# Patient Record
Sex: FEMALE | Race: WHITE | URBAN - METROPOLITAN AREA
[De-identification: names, ages, dates, MRNs, and addresses within clinical notes are randomized per-mention and may not be internally consistent; named-entity substitution may affect disease eponyms.]

---

## 2019-10-03 LAB
AVERAGE GLUCOSE: NORMAL
HBA1C MFR BLD: 5.6 %

## 2022-06-21 ENCOUNTER — APPOINTMENT (RX ONLY)
Dept: URBAN - METROPOLITAN AREA CLINIC 339 | Facility: CLINIC | Age: 62
Setting detail: DERMATOLOGY
End: 2022-06-21

## 2022-06-21 DIAGNOSIS — L91.8 OTHER HYPERTROPHIC DISORDERS OF THE SKIN: ICD-10-CM

## 2022-06-21 DIAGNOSIS — D18.0 HEMANGIOMA: ICD-10-CM

## 2022-06-21 DIAGNOSIS — D22 MELANOCYTIC NEVI: ICD-10-CM

## 2022-06-21 DIAGNOSIS — L81.5 LEUKODERMA, NOT ELSEWHERE CLASSIFIED: ICD-10-CM

## 2022-06-21 DIAGNOSIS — L81.4 OTHER MELANIN HYPERPIGMENTATION: ICD-10-CM

## 2022-06-21 DIAGNOSIS — L82.1 OTHER SEBORRHEIC KERATOSIS: ICD-10-CM

## 2022-06-21 PROBLEM — D22.5 MELANOCYTIC NEVI OF TRUNK: Status: ACTIVE | Noted: 2022-06-21

## 2022-06-21 PROBLEM — D18.01 HEMANGIOMA OF SKIN AND SUBCUTANEOUS TISSUE: Status: ACTIVE | Noted: 2022-06-21

## 2022-06-21 PROCEDURE — 99213 OFFICE O/P EST LOW 20 MIN: CPT

## 2022-06-21 PROCEDURE — ? COUNSELING

## 2022-06-21 PROCEDURE — ? FULL BODY SKIN EXAM

## 2022-06-21 PROCEDURE — ? TREATMENT REGIMEN

## 2022-06-21 ASSESSMENT — LOCATION DETAILED DESCRIPTION DERM
LOCATION DETAILED: LEFT RIB CAGE
LOCATION DETAILED: LEFT PROXIMAL PRETIBIAL REGION
LOCATION DETAILED: EPIGASTRIC SKIN
LOCATION DETAILED: RIGHT PROXIMAL DORSAL FOREARM
LOCATION DETAILED: SUBXIPHOID
LOCATION DETAILED: LEFT PROXIMAL DORSAL FOREARM

## 2022-06-21 ASSESSMENT — LOCATION SIMPLE DESCRIPTION DERM
LOCATION SIMPLE: LEFT PRETIBIAL REGION
LOCATION SIMPLE: ABDOMEN
LOCATION SIMPLE: RIGHT FOREARM
LOCATION SIMPLE: LEFT FOREARM

## 2022-06-21 ASSESSMENT — LOCATION ZONE DERM
LOCATION ZONE: ARM
LOCATION ZONE: TRUNK
LOCATION ZONE: LEG

## 2023-01-09 ENCOUNTER — OFFICE VISIT (OUTPATIENT)
Dept: ORTHOPEDIC SURGERY | Age: 63
End: 2023-01-09
Payer: OTHER GOVERNMENT

## 2023-01-09 VITALS — HEIGHT: 64 IN | BODY MASS INDEX: 24.75 KG/M2 | WEIGHT: 145 LBS

## 2023-01-09 DIAGNOSIS — M48.56XA PATHOLOGICAL COMPRESSION FRACTURE OF LUMBAR VERTEBRA, INITIAL ENCOUNTER (HCC): Primary | ICD-10-CM

## 2023-01-09 DIAGNOSIS — M51.36 DDD (DEGENERATIVE DISC DISEASE), LUMBAR: ICD-10-CM

## 2023-01-09 DIAGNOSIS — M43.16 SPONDYLOLISTHESIS OF LUMBAR REGION: ICD-10-CM

## 2023-01-09 DIAGNOSIS — M47.816 FACET ARTHROPATHY, LUMBAR: ICD-10-CM

## 2023-01-09 PROCEDURE — 99204 OFFICE O/P NEW MOD 45 MIN: CPT | Performed by: PHYSICIAN ASSISTANT

## 2023-01-09 RX ORDER — TRAMADOL HYDROCHLORIDE 50 MG/1
50 TABLET ORAL EVERY 8 HOURS PRN
Qty: 21 TABLET | Refills: 0 | Status: SHIPPED | OUTPATIENT
Start: 2023-01-09 | End: 2023-01-16

## 2023-01-09 RX ORDER — SIMVASTATIN 40 MG
TABLET ORAL
COMMUNITY
Start: 2022-11-15

## 2023-01-09 RX ORDER — IBUPROFEN 600 MG/1
TABLET ORAL
COMMUNITY
Start: 2022-12-12

## 2023-01-09 NOTE — PATIENT INSTRUCTIONS
VERTEBRAL KYPHOPLASTY    Kyphoplasty is a vertebral augmentation surgery to treat fractures in the vertebra. These fractures may occur because of conditions such as osteoporosis or trauma. Vertebroplasty is a similar technique. Both procedures involve injecting acrylic bone cement into the fracture through a hole in the skin. The difference between the two techniques is that kyphoplasty also attempts to address curvature of the spine to restore height. This involves inflating a small balloon in the vertebra to create space before injecting the bone cement. Some doctors refer to this procedure as balloon kyphoplasty. Why is it needed? A surgeon may perform kyphoplasty for a vertebral compression fracture (VCF). VCFs occur when the bony block in the spine collapses, which may cause extreme pain, deformity, and loss of height. The incidence of VCFs increases with age and most result from low energy injuries in older people with osteoporosis. However, other possible causes of VCFs include trauma from car accidents or sports injuries or tumors that have started in the spine or spread to the bones. According to [address] from the [place] [employer] ([employer]), many people who have a VCF recover within 3 months without specific treatment to repair the fracture. Sometimes, a doctor will recommend that a person wears a brace to restrict movement, which helps the fracture to heal.   If a person has severe pain that does not respond to non-surgical treatment, then surgery is an option. Procedure   During the days before surgery, a doctor may recommend that a person avoids taking drugs that make it hard for blood to clot, such as aspirin and warfarin. On the day of surgery, a person will probably be told not to eat or drink anything for several hours before the procedure. A person can take any medication a doctor approves, along with sips of water.   A surgeon will perform kyphoplasty in a hospital or outpatient clinic. A person may have either local or general anesthetic. The surgical procedure is as follows:   A surgeon will insert a needle into the spine bone through the skin. They will then use X-ray images to guide them to the correct area. They will then place a small device called a balloon tamp through the needle and into the fractured vertebra. The surgeon will inflate the balloon tamp. This helps to restore the height of the vertebrae. When the surgeon removes the balloon tamp, it leaves a cavity that is injected with acrylic bone cement to prevent it from collapsing again. Most people will be able to go home from the hospital the same day. A person should not drive unless a medical professional approves it. Recovery time  The [employer] suggest that following surgery, most people can go back to their normal activities of daily living with no restrictions. A person should check with their doctor or surgeon if there are any activities that they should avoid during the recovery period. A person may feel some soreness in their back where the surgeon inserted the needle, but this will not usually last longer than a few days. Risks and complications  The risks and complications of kyphoplasty may include:  infection   increased back pain   bleeding   nerve damage   allergic reaction to the chemicals used with X-rays to help guide the surgeon   acrylic bone cement leaking out of position    A 2016 review indicates that although risks are rare, serious complications can occur, including spinal cord compression, nerve root compression, venous embolism, and pulmonary embolism, including cardiovascular collapse. According to the [employer], Kyphoplasty complication rates are at less than 2% for osteoporotic VCFs, and up to 10% for malignant tumor-related VCFs. Benefits  Osteoporotic vertebral fractures have associations with increased morbidity. Techniques such as kyphoplasty can provide pain relief and improve spinal support function. People who have a kyphoplasty procedure may have a better quality of life afterward. They may require fewer pain relievers and be more mobile. However, the effectiveness of kyphoplasty seems to be under debate in the medical community. A person should discuss the benefits and risks with their doctor. ummary   Kyphoplasty may improve a persons quality of life and mobility and reduce pain and the use of pain relief medication. A doctor may recommend kyphoplasty if non-surgical means do not work. The effectiveness of kyphoplasty is under debate, and there is a risk of complications. Someone who is considering having this procedure should discuss the benefits and risks with their doctor. Patient Education        Compression Fracture of the Spine: Care Instructions  Overview     A compression fracture happens when the front part of a spinal bone breaks and collapses. A fall or other accident can cause it. A minor injury or moving the wrong way can cause a break if you have thin or brittle bones (osteoporosis). These types of breaks usually will heal within a few months. You may need to rest at first, but it's best to return to your normal activity as soon as you can. You may need medicine for pain. Your doctor may recommend physical therapy. Some doctors recommend that certain people with compression fractures wear braces. Your doctor also may treat thin or brittle bones. You may need surgery if you have lasting pain or if the bone presses on the spinal cord or nerves. You heal best when you take good care of yourself. Eat a variety of healthy foods, and don't smoke. Follow-up care is a key part of your treatment and safety. Be sure to make and go to all appointments, and call your doctor if you are having problems. It's also a good idea to know your test results and keep a list of the medicines you take.   How can you care for yourself at home? Be safe with medicines. Read and follow all instructions on the label. If the doctor gave you a prescription medicine for pain, take it as prescribed. If you are not taking a prescription pain medicine, ask your doctor if you can take an over-the-counter medicine. Talk to your doctor about how to make your bones stronger. You may need medicines or a change in what you eat. Be active only as directed by your doctor. When should you call for help? Call 911 anytime you think you may need emergency care. For example, call if:    You are unable to move an arm or a leg at all. Call your doctor now or seek immediate medical care if:    You have new or worse symptoms in your arms, legs, belly, or buttocks. Symptoms may include:  Numbness or tingling. Weakness. Pain. You lose bladder or bowel control. You have belly pain, bloating, vomiting, or nausea. Watch closely for changes in your health, and be sure to contact your doctor if:    You do not get better as expected. Where can you learn more? Go to http://www.woods.com/ and enter P445 to learn more about \"Compression Fracture of the Spine: Care Instructions. \"  Current as of: March 9, 2022               Content Version: 13.5  © 6115-4992 [employer]. Care instructions adapted under license by [facility] ([facility]). If you have questions about a medical condition or this instruction, always ask your healthcare professional. [place] [address] any warranty or liability for your use of this information.

## 2023-01-09 NOTE — PROGRESS NOTES
Name: German Stanton  YOB: 1960  Gender: female  MRN: [id number]    Back Pain (L5 Comp fracture examination)       History of Present Illness: This is a very pleasant 60y.o. year old female who number seventh fell from a 2 step ladder landing on her buttocks. Severe pain in the lower back at that time. The following day she went to urgent care. L5 compression deformity was noted. Patient reports symptoms mostly in the lower back can radiate somewhat into the right buttock there is no leg pain. She does have some numbness and tingling of bilateral feet but this is felt to be from neuropathy from chemotherapy from her breast cancer. Patient reports that symptoms have improved from the initial injury 1 month ago but not resolved. Pain is worse with getting up and down from sitting positions, increased activity. Pain is still 6 out of 10. She is taking Tylenol, ibuprofen and aspirin. This patient has not had lumbar surgery in the past.          AMB PAIN ASSESSMENT 1/9/2023   Location of Pain Back   Severity of Pain 6   Quality of Pain Aching   Duration of Pain Persistent   Frequency of Pain Constant   Date Pain First Started 12/7/2022   Aggravating Factors Other (Comment)   Limiting Behavior No   Relieving Factors Other (Comment); Nsaids   Result of Injury No   Work-Related Injury No   Are there other pain locations you wish to document? No            ROS/Meds/PSH/PMH/FH/SH: I personally reviewed the patient's collected intake data. Below are the pertinents:    Allergies   Allergen Reactions    Amoxicillin-Pot Clavulanate      Other reaction(s): Rash       Current Outpatient Medications:     ibuprofen (ADVIL;MOTRIN) 600 MG tablet, , Disp: , Rfl:     metoprolol tartrate (LOPRESSOR) 25 MG tablet, , Disp: , Rfl:     simvastatin (ZOCOR) 40 MG tablet, , Disp: , Rfl:     traMADol (ULTRAM) 50 MG tablet, Take 1 tablet by mouth every 8 hours as needed for Pain for up to 7 days.  Intended supply: 7 days. Take lowest dose possible to manage pain Max Daily Amount: 150 mg, Disp: 21 tablet, Rfl: 0  There is no problem list on file for this patient. Tobacco:  reports that she has never smoked. She has never used smokeless tobacco.  Alcohol:   Social History     Substance and Sexual Activity   Alcohol Use None        Physical Exam:   BMI: Body mass index is 24.89 kg/m². GENERAL:  Adult in no acute distress, well developed, well nourished . Patient is appropriately conversant  MSK:  Examination of the lumbar spine reveals no sagittal or coronal imbalance. There is moderate tenderness to palpation along the spinous processes and paraspinal musculature. The patient ambulates with a normal gait. ROM of bilateral hip(s) reveals no irritability. NEURO:  Cranial nerves grossly intact. No motor deficits. Straight leg testing is negative  Sensory testing reveals intact sensation to light touch and in the distribution of the L3-S1 dermatomes bilaterally      Ankle jerk is negative for clonus  Babinski is negative    Reflexes   Right Left   Quadriceps (L4) 2 2   Achilles (S1) 2 2     Strength testing in the lower extremity reveals the following based on the 5 point grading scale:     HF (L2) H Ab (L5) KE (L3/4) ADF (L4) EHL (L5) A Ev (S1) APF (S1)   Right 5 5 5 5 5 5 5   Left 5 5 5 5 5 5 5     PSYCH:  Alert and oriented X 3. Appropriate affect. Intact judgment and insight. Radiographic Studies:    AP and Lateral lumbar spine: 12/8/22  Independently reviewed the x-rays from 12/8/2022. There is facet arthropathy L4-5 L5-S1. There is a subtle anterior listhesis L4-5. There is compression of the superior endplate of L5 vertebra partially 30%. This is compression fracture likely acute. Assessment/Plan:       Diagnosis Orders   1.  Pathological compression fracture of lumbar vertebra, initial encounter ([facility])  traMADol (ULTRAM) 50 MG tablet    MRI LUMBAR SPINE WO CONTRAST    Amb External Referral For Orthotics      2. Facet arthropathy, lumbar  MRI LUMBAR SPINE WO CONTRAST    Amb External Referral For Orthotics      3. DDD (degenerative disc disease), lumbar  MRI LUMBAR SPINE WO CONTRAST    Amb External Referral For Orthotics      4. Spondylolisthesis of lumbar region  MRI LUMBAR SPINE WO CONTRAST    Amb External Referral For Orthotics            This patient's clinical history and physical exam is consistent with a compression fracture of L5. We went over the treatment options as follows. I told her that the natural history of this condition is slow resolution of symptoms over a several month period. She  could opt to treat this with symptomatic care including pain management and/or could also try a brace. With either of these measures, the fracture would be expected to heal in its current position without restoration of height or deformity. Alternatively, she  could consider a kyphoplasty. The benefit of the kyphoplasty is that fracture pain would subside almost immediately, and there is a good possibility of partial restoration of the vertebral body shape. The downside is the fact that she has to undergo surgery, and the mismatch of the hardness of the cement with the hardness of the osteoporotic adjacent vertebrae. This could theoretically predispose an adjacent level fracture. - A lumbar orthosis was prescribed in an attempt to reduce pain by restricting truck mobility. The patient was counseled that the she should not wear this greater than four hours per day because of risks of paraspinal muscle deconditioning with prolonged use of the lumbar brace. The brace should not be worn during periods of sleep. - Opioid: The patient was prescribed an oral pain medication. The patient understands that this is a temporary measure to bring acute or post-surgical pain under control and that it is out of the scope of this practice to continue opiates on a long-term basis.  The [place] Controlled Substance database was reviewed prior to prescribing.   - A MRI was ordered to delineate anatomy, confirm the diagnosis and assess the severity. We will review results of the MRI scan and the acuity of the fracture. We will provide her with literature on kyphoplasty. We will discussed further treatment options when she returns. MRI scan will also help us delineate facet arthropathy spondylolisthesis and neurogenic compression that could be a source of her pain as well. 4 This is an acute complicated injury    Orders Placed This Encounter   Medications    traMADol (ULTRAM) 50 MG tablet     Sig: Take 1 tablet by mouth every 8 hours as needed for Pain for up to 7 days. Intended supply: 7 days. Take lowest dose possible to manage pain Max Daily Amount: 150 mg     Dispense:  21 tablet     Refill:  0     Reduce doses taken as pain becomes manageable        Orders Placed This Encounter   Procedures    MRI LUMBAR SPINE WO CONTRAST    Amb External Referral For Orthotics              No follow-ups on file. Briseyda Koch PA-C  01/09/23      Elements of this note were created using speech recognition software. As such, errors of speech recognition may be present.

## 2023-01-09 NOTE — LETTER
Bertha DE SANTIAGOT  1960  MRN [id number]                                              ROOM NUMBER______      Radiographic Studies:    Cervical MRI      Thoracic MRI         Lumbar MRI          Pelvis MRI        CONTRAST    CT Myelogram: _______________   NCS/EMG ________________ ( UE  /  LE )     MRI of ___________________          Other: ____________________      Injections:    KNEE    HIP  Depomedrol _____ mg Euflexxa _____    _______________ TFESI/SNRB  _______________ SI Joint  _______________ ANITA    _______________ Facet  _______________Piriformis/ Sciatica      Medications:    Oral Steroids _______________  NSAIDS _______________    Muscle Relaxers _______________  Neurontin/Lyrica _______________    Pain Medicine _______________  Other _______________                       Physical Therapy:    Lumbar     Thoracic      Cervical     Hip       Knee       Shoulder               Traction          Ultrasound          Dry Needling      Referral:    Pain referral:  [facility]   [facility]   Other: ______________________________    Follow-up/ Refer__________________________________________________    Authorization to hold blood thinners:___________________________________

## 2023-01-24 ENCOUNTER — OFFICE VISIT (OUTPATIENT)
Dept: ORTHOPEDIC SURGERY | Age: 63
End: 2023-01-24
Payer: OTHER GOVERNMENT

## 2023-01-24 DIAGNOSIS — M43.16 SPONDYLOLISTHESIS OF LUMBAR REGION: ICD-10-CM

## 2023-01-24 DIAGNOSIS — M47.816 FACET ARTHROPATHY, LUMBAR: ICD-10-CM

## 2023-01-24 DIAGNOSIS — M48.56XA PATHOLOGICAL COMPRESSION FRACTURE OF LUMBAR VERTEBRA, INITIAL ENCOUNTER (HCC): Primary | ICD-10-CM

## 2023-01-24 PROCEDURE — 99214 OFFICE O/P EST MOD 30 MIN: CPT | Performed by: PHYSICIAN ASSISTANT

## 2023-01-24 NOTE — PROGRESS NOTES
Name: German Stanton  YOB: 1960  Gender: female  MRN: [id number]    Back Pain (Follow up for MRI results)       History of Present Illness: This is a very pleasant 60y.o. year old female who 12/7/22 fell from a 2 step ladder landing on her buttocks. Severe pain in the lower back at that time. The following day she went to urgent care. L5 compression deformity was noted. Patient reports symptoms mostly in the lower back can radiate somewhat into the right buttock there is no leg pain. She does have some numbness and tingling of bilateral feet but this is felt to be from neuropathy from chemotherapy from her breast cancer. We sent her for MRI scan of the lumbar spine to evaluate acuity of the fracture. We also saw she had a anterior listhesis L4-5 and wanted to see if there was any neurogenic compression. She returns today to review this. She is wearing her LSO brace. Symptoms are improving. ROS/Meds/PSH/PMH/FH/SH: I personally reviewed the patient's collected intake data. Below are the pertinents:    Allergies   Allergen Reactions    Amoxicillin-Pot Clavulanate      Other reaction(s): Rash       Current Outpatient Medications:     ibuprofen (ADVIL;MOTRIN) 600 MG tablet, , Disp: , Rfl:     metoprolol tartrate (LOPRESSOR) 25 MG tablet, , Disp: , Rfl:     simvastatin (ZOCOR) 40 MG tablet, , Disp: , Rfl:   There is no problem list on file for this patient. Tobacco:  reports that she has never smoked. She has never used smokeless tobacco.  Alcohol:   Social History     Substance and Sexual Activity   Alcohol Use None        Physical Exam:   BMI: There is no height or weight on file to calculate BMI. GENERAL:  Adult in no acute distress, well developed, well nourished . Patient is appropriately conversant  MSK:  Examination of the lumbar spine reveals no sagittal or coronal imbalance.   There is moderate tenderness to palpation along the spinous processes and paraspinal musculature. The patient ambulates with a normal gait. ROM of bilateral hip(s) reveals no irritability. NEURO:  Cranial nerves grossly intact. No motor deficits. Straight leg testing is negative  Sensory testing reveals intact sensation to light touch and in the distribution of the L3-S1 dermatomes bilaterally      Ankle jerk is negative for clonus  Babinski is negative    Reflexes   Right Left   Quadriceps (L4) 2 2   Achilles (S1) 2 2     Strength testing in the lower extremity reveals the following based on the 5 point grading scale:     HF (L2) H Ab (L5) KE (L3/4) ADF (L4) EHL (L5) A Ev (S1) APF (S1)   Right 5 5 5 5 5 5 5   Left 5 5 5 5 5 5 5     PSYCH:  Alert and oriented X 3. Appropriate affect. Intact judgment and insight. Radiographic Studies:    AP and Lateral lumbar spine: 12/8/22  Independently reviewed the x-rays from 12/8/2022. There is facet arthropathy L4-5 L5-S1. There is a subtle anterior listhesis L4-5. There is compression of the superior endplate of L5 vertebra partially 30%. This is compression fracture likely acute. MRI Result (most recent): MRI LUMBAR SPINE WO CONTRAST 01/20/2023    Narrative  Exam: MRI lumbar spine without contrast.  Indication: Low back pain, compression fractures. Comparison: Lumbar spine radiograph, 12/8/2022  Contrast: None  Technique: Multiplanar multisequence imaging of the lumbar spine was performed  without contrast.    FINDINGS:  There are 5 nonrib-bearing lumbar-type vertebral bodies. Minimal grade 1  anterolisthesis of L4 on L5. Acute superior endplate compression fracture of L5  with 50% vertebral body height loss. The fracture lines extend into the  posterior vertebral body cortex without osseous retropulsion. There is no  underlying aggressive marrow signal abnormality. No additional acute fractures. Marrow edema along the S1 superior endplate is  favored degenerative. Benign osseous hemangioma in the L1 vertebral body. The  conus medullaris terminates in expected position. Cauda equina nerve roots are  unremarkable. Small sacral Tarlov cyst. No evidence of intraspinal fluid  collection. Major ligamentous structures are intact. Perivertebral soft tissues  are unremarkable. Multilevel disc desiccation without significant disc space  narrowing. T11-T12: No spinal canal or neuroforaminal stenosis. T12-L1: No spinal canal or neuroforaminal stenosis. L1-L2: No spinal canal or neuroforaminal stenosis. L2-L3: No spinal canal or neuroforaminal stenosis. L3-L4: No spinal canal or neuroforaminal stenosis. L4-L5: Mild diffuse disc bulge with bilateral facet arthropathy. Mild spinal  canal stenosis with mild bilateral neuroforaminal stenoses. L5-S1: Mild diffuse disc bulge with small bilateral paracentral annular fissures  and superimposed left foraminal protrusion. Bilateral facet arthropathy. No  spinal canal stenosis. Mild right and moderate left neuroforaminal stenoses. Impression  1. Acute two column compression fracture of L5 with 50% height loss. No osseous  retropulsion. 2. No additional acute fractures. No aggressive marrow lesions. 3. Multilevel degenerative disc disease and facet arthropathy, most notably at  L4-L5 where there is minimal grade 1 anterolisthesis and mild spinal canal  stenosis. 4. Neuroforaminal stenoses are most pronounced and moderate on the left at  L5-S1. I independently reviewed the MRI of the lumbar spine. She does have an acute compression fracture L5 with 50% height loss. This has not significantly changed in height from the prior x-ray 12/8/22. L4-5 there is minimal grade 1 anterior listhesis with mild stenosis no significant central stenosis. Assessment/Plan:       Diagnosis Orders   1. Pathological compression fracture of lumbar vertebra, initial encounter ([facility] [address]        2. Spondylolisthesis of lumbar region        3.  Facet arthropathy, lumbar                This patient's clinical history and physical exam is consistent with a compression fracture of L5. We went over the treatment options as follows. I told her that the natural history of this condition is slow resolution of symptoms over a several month period. She  could opt to treat this with symptomatic care including pain management and/or could also try a brace. With either of these measures, the fracture would be expected to heal in its current position without restoration of height or deformity. Alternatively, she  could consider a kyphoplasty. The benefit of the kyphoplasty is that fracture pain would subside almost immediately, and there is a good possibility of partial restoration of the vertebral body shape. The downside is the fact that she has to undergo surgery, and the mismatch of the hardness of the cement with the hardness of the osteoporotic adjacent vertebrae. This could theoretically predispose an adjacent level fracture. Reviewed her MRI scan. She does have compression fracture it is still acute. Is been 6 weeks since the incident. She is in a brace. She desires to continue to treat this conservatively without kyphoplasty procedure. I will see her back in 6 weeks. If she has worsening pain in the next couple weeks, she can call me and we will refer her to pain management for kyphoplasty. 4 This is an acute complicated injury    No orders of the defined types were placed in this encounter. No orders of the defined types were placed in this encounter. No follow-ups on file. Tracey Obrien PA-C  01/24/23      Elements of this note were created using speech recognition software. As such, errors of speech recognition may be present.

## 2023-01-30 ENCOUNTER — TELEPHONE (OUTPATIENT)
Dept: ORTHOPEDIC SURGERY | Age: 63
End: 2023-01-30

## 2023-03-07 ENCOUNTER — OFFICE VISIT (OUTPATIENT)
Dept: ORTHOPEDIC SURGERY | Age: 63
End: 2023-03-07
Payer: OTHER GOVERNMENT

## 2023-03-07 DIAGNOSIS — M54.50 LOW BACK PAIN, UNSPECIFIED BACK PAIN LATERALITY, UNSPECIFIED CHRONICITY, UNSPECIFIED WHETHER SCIATICA PRESENT: Primary | ICD-10-CM

## 2023-03-07 DIAGNOSIS — M48.56XD PATHOLOGICAL COMPRESSION FRACTURE OF LUMBAR VERTEBRA WITH ROUTINE HEALING, SUBSEQUENT ENCOUNTER: ICD-10-CM

## 2023-03-07 DIAGNOSIS — M43.16 SPONDYLOLISTHESIS OF LUMBAR REGION: ICD-10-CM

## 2023-03-07 PROCEDURE — 99213 OFFICE O/P EST LOW 20 MIN: CPT | Performed by: PHYSICIAN ASSISTANT

## 2023-03-07 NOTE — PROGRESS NOTES
Name: Aba Benitez  YOB: 1960  Gender: female  MRN: [id number]    Back Pain (Recheck comp fx)       History of Present Illness: This is a very pleasant 60y.o. year old female who 12/7/22 fell from a 2 step ladder landing on her buttocks. Severe pain in the lower back at that time. The following day she went to urgent care. L5 compression deformity was noted. Patient reports symptoms mostly in the lower back can radiate somewhat into the right buttock there is no leg pain. She does have some numbness and tingling of bilateral feet but this is felt to be from neuropathy from chemotherapy from her breast cancer. We sent her for MRI scan of the lumbar spine to evaluate acuity of the fracture. We also saw she had a anterior listhesis L4-5 and wanted to see if there was any neurogenic compression. She returns today to review this. She is wearing her LSO brace. Symptoms are improving. ROS/Meds/PSH/PMH/FH/SH: I personally reviewed the patient's collected intake data. Below are the pertinents:    Allergies   Allergen Reactions    Amoxicillin-Pot Clavulanate      Other reaction(s): Rash       Current Outpatient Medications:     ibuprofen (ADVIL;MOTRIN) 600 MG tablet, , Disp: , Rfl:     metoprolol tartrate (LOPRESSOR) 25 MG tablet, , Disp: , Rfl:     simvastatin (ZOCOR) 40 MG tablet, , Disp: , Rfl:   There is no problem list on file for this patient. Tobacco:  reports that she has never smoked. She has never used smokeless tobacco.  Alcohol:   Social History     Substance and Sexual Activity   Alcohol Use None        Physical Exam:   BMI: There is no height or weight on file to calculate BMI. GENERAL:  Adult in no acute distress, well developed, well nourished . Patient is appropriately conversant  MSK:  Examination of the lumbar spine reveals no sagittal or coronal imbalance.   There is moderate tenderness to palpation along the spinous processes and paraspinal musculature. The patient ambulates with a normal gait. ROM of bilateral hip(s) reveals no irritability. NEURO:  Cranial nerves grossly intact. No motor deficits. Straight leg testing is negative  Sensory testing reveals intact sensation to light touch and in the distribution of the L3-S1 dermatomes bilaterally      Ankle jerk is negative for clonus  Babinski is negative    Reflexes   Right Left   Quadriceps (L4) 2 2   Achilles (S1) 2 2     Strength testing in the lower extremity reveals the following based on the 5 point grading scale:     HF (L2) H Ab (L5) KE (L3/4) ADF (L4) EHL (L5) A Ev (S1) APF (S1)   Right 5 5 5 5 5 5 5   Left 5 5 5 5 5 5 5     PSYCH:  Alert and oriented X 3. Appropriate affect. Intact judgment and insight. Radiographic Studies:    AP and Lateral lumbar spine: 12/8/22  Independently reviewed the x-rays from 12/8/2022. There is facet arthropathy L4-5 L5-S1. There is a subtle anterior listhesis L4-5. There is compression of the superior endplate of L5 vertebra partially 30%. This is compression fracture likely acute. MRI Result (most recent): MRI LUMBAR SPINE WO CONTRAST 01/20/2023    Narrative  Exam: MRI lumbar spine without contrast.  Indication: Low back pain, compression fractures. Comparison: Lumbar spine radiograph, 12/8/2022  Contrast: None  Technique: Multiplanar multisequence imaging of the lumbar spine was performed  without contrast.    FINDINGS:  There are 5 nonrib-bearing lumbar-type vertebral bodies. Minimal grade 1  anterolisthesis of L4 on L5. Acute superior endplate compression fracture of L5  with 50% vertebral body height loss. The fracture lines extend into the  posterior vertebral body cortex without osseous retropulsion. There is no  underlying aggressive marrow signal abnormality. No additional acute fractures. Marrow edema along the S1 superior endplate is  favored degenerative. Benign osseous hemangioma in the L1 vertebral body.  The  conus medullaris terminates in expected position. Cauda equina nerve roots are  unremarkable. Small sacral Tarlov cyst. No evidence of intraspinal fluid  collection. Major ligamentous structures are intact. Perivertebral soft tissues  are unremarkable. Multilevel disc desiccation without significant disc space  narrowing. T11-T12: No spinal canal or neuroforaminal stenosis. T12-L1: No spinal canal or neuroforaminal stenosis. L1-L2: No spinal canal or neuroforaminal stenosis. L2-L3: No spinal canal or neuroforaminal stenosis. L3-L4: No spinal canal or neuroforaminal stenosis. L4-L5: Mild diffuse disc bulge with bilateral facet arthropathy. Mild spinal  canal stenosis with mild bilateral neuroforaminal stenoses. L5-S1: Mild diffuse disc bulge with small bilateral paracentral annular fissures  and superimposed left foraminal protrusion. Bilateral facet arthropathy. No  spinal canal stenosis. Mild right and moderate left neuroforaminal stenoses. Impression  1. Acute two column compression fracture of L5 with 50% height loss. No osseous  retropulsion. 2. No additional acute fractures. No aggressive marrow lesions. 3. Multilevel degenerative disc disease and facet arthropathy, most notably at  L4-L5 where there is minimal grade 1 anterolisthesis and mild spinal canal  stenosis. 4. Neuroforaminal stenoses are most pronounced and moderate on the left at  L5-S1. I independently reviewed the MRI of the lumbar spine. She does have an acute compression fracture L5 with 50% height loss. This has not significantly changed in height from the prior x-ray 12/8/22. L4-5 there is minimal grade 1 anterior listhesis with mild stenosis no significant central stenosis. Assessment/Plan:       Diagnosis Orders   1. Low back pain, unspecified back pain laterality, unspecified chronicity, unspecified whether sciatica present  XR LUMBAR SPINE (2-3 VIEWS)      2. Spondylolisthesis of lumbar region        3. Pathological compression fracture of lumbar vertebra, initial encounter [facility])                This patient's clinical history and physical exam is consistent with a compression fracture of L5. We went over the treatment options as follows. I told her that the natural history of this condition is slow resolution of symptoms over a several month period. She  could opt to treat this with symptomatic care including pain management and/or could also try a brace. With either of these measures, the fracture would be expected to heal in its current position without restoration of height or deformity. Alternatively, she  could consider a kyphoplasty. The benefit of the kyphoplasty is that fracture pain would subside almost immediately, and there is a good possibility of partial restoration of the vertebral body shape. The downside is the fact that she has to undergo surgery, and the mismatch of the hardness of the cement with the hardness of the osteoporotic adjacent vertebrae. This could theoretically predispose an adjacent level fracture. Reviewed her MRI scan. She does have compression fracture it is still acute. Is been 6 weeks since the incident. She is in a brace. She desires to continue to treat this conservatively without kyphoplasty procedure. I will see her back in 6 weeks. If she has worsening pain in the next couple weeks, she can call me and we will refer her to pain management for kyphoplasty. 4 This is an acute complicated injury    No orders of the defined types were placed in this encounter. Orders Placed This Encounter   Procedures    XR LUMBAR SPINE (2-3 VIEWS)                Return if symptoms worsen or fail to improve. Nisha Ureña PA-C  03/07/23      Elements of this note were created using speech recognition software. As such, errors of speech recognition may be present.

## 2023-03-07 NOTE — PROGRESS NOTES
Name: Paras Robles  YOB: 1960  Gender: female  MRN: [id number]    Back Pain (Recheck comp fx)       History of Present Illness: This is a very pleasant 60y.o. year old female who 12/7/22 fell from a 2 step ladder landing on her buttocks. Severe pain in the lower back at that time. The following day she went to urgent care. L5 compression deformity was noted. Patient reports symptoms mostly in the lower back can radiate somewhat into the right buttock there is no leg pain. She does have some numbness and tingling of bilateral feet but this is felt to be from neuropathy from chemotherapy from her breast cancer. We sent her for MRI scan of the lumbar spine to evaluate acuity of the fracture. We also saw she had a anterior listhesis L4-5 and wanted to see if there was any neurogenic compression. She returns today to review this. She is wearing her LSO brace. Symptoms are improving. She does have an acute compression fracture L5 with 50% height loss. This has not significantly changed in height from the prior x-ray 12/8/22. L4-5 there is minimal grade 1 anterior listhesis with mild stenosis no significant central stenosis. She does have facet arthropathy multiple levels. ROS/Meds/PSH/PMH/FH/SH: I personally reviewed the patient's collected intake data. Below are the pertinents:    Allergies   Allergen Reactions    Amoxicillin-Pot Clavulanate      Other reaction(s): Rash       Current Outpatient Medications:     ibuprofen (ADVIL;MOTRIN) 600 MG tablet, , Disp: , Rfl:     metoprolol tartrate (LOPRESSOR) 25 MG tablet, , Disp: , Rfl:     simvastatin (ZOCOR) 40 MG tablet, , Disp: , Rfl:   There is no problem list on file for this patient. Tobacco:  reports that she has never smoked.  She has never used smokeless tobacco.  Alcohol:   Social History     Substance and Sexual Activity   Alcohol Use None        Radiographic Studies:    AP and Lateral lumbar spine: 3/7/23  AP of the lumbar spine shows normal alignment. Pelvis is unlevel. Lower lumbar degenerative changes and facet arthropathy. There is facet arthropathy L4-5 L5-S1. There is anterior listhesis L4-5. There is compression of the superior endplate of L5 vertebra 40% my mild subtle increase in compression from the December x-rays but no significant change from MRI scan. .      Impression: Stable L5 compression fracture, healing, degenerative changes lumbar spine with spondylolisthesis      MRI Result (most recent): MRI LUMBAR SPINE WO CONTRAST 01/20/2023    Narrative  Exam: MRI lumbar spine without contrast.  Indication: Low back pain, compression fractures. Comparison: Lumbar spine radiograph, 12/8/2022  Contrast: None  Technique: Multiplanar multisequence imaging of the lumbar spine was performed  without contrast.    FINDINGS:  There are 5 nonrib-bearing lumbar-type vertebral bodies. Minimal grade 1  anterolisthesis of L4 on L5. Acute superior endplate compression fracture of L5  with 50% vertebral body height loss. The fracture lines extend into the  posterior vertebral body cortex without osseous retropulsion. There is no  underlying aggressive marrow signal abnormality. No additional acute fractures. Marrow edema along the S1 superior endplate is  favored degenerative. Benign osseous hemangioma in the L1 vertebral body. The  conus medullaris terminates in expected position. Cauda equina nerve roots are  unremarkable. Small sacral Tarlov cyst. No evidence of intraspinal fluid  collection. Major ligamentous structures are intact. Perivertebral soft tissues  are unremarkable. Multilevel disc desiccation without significant disc space  narrowing. T11-T12: No spinal canal or neuroforaminal stenosis. T12-L1: No spinal canal or neuroforaminal stenosis. L1-L2: No spinal canal or neuroforaminal stenosis. L2-L3: No spinal canal or neuroforaminal stenosis.     L3-L4: No spinal canal or neuroforaminal stenosis. L4-L5: Mild diffuse disc bulge with bilateral facet arthropathy. Mild spinal  canal stenosis with mild bilateral neuroforaminal stenoses. L5-S1: Mild diffuse disc bulge with small bilateral paracentral annular fissures  and superimposed left foraminal protrusion. Bilateral facet arthropathy. No  spinal canal stenosis. Mild right and moderate left neuroforaminal stenoses. Impression  1. Acute two column compression fracture of L5 with 50% height loss. No osseous  retropulsion. 2. No additional acute fractures. No aggressive marrow lesions. 3. Multilevel degenerative disc disease and facet arthropathy, most notably at  L4-L5 where there is minimal grade 1 anterolisthesis and mild spinal canal  stenosis. 4. Neuroforaminal stenoses are most pronounced and moderate on the left at  L5-S1. I independently reviewed the MRI of the lumbar spine. She does have an acute compression fracture L5 with 50% height loss. This has not significantly changed in height from the prior x-ray 12/8/22. L4-5 there is minimal grade 1 anterior listhesis with mild stenosis no significant central stenosis. Assessment/Plan:       Diagnosis Orders   1. Low back pain, unspecified back pain laterality, unspecified chronicity, unspecified whether sciatica present  XR LUMBAR SPINE (2-3 VIEWS)    Amb External Referral To Physical Therapy      2. Spondylolisthesis of lumbar region  Amb External Referral To Physical Therapy      3. Pathological compression fracture of lumbar vertebra with routine healing, subsequent encounter  Amb External Referral To Physical Therapy              This patient's clinical history and physical exam is consistent with a compression fracture of L5. He is now 3 months from the injury. She is out of her brace. Her symptoms have significantly improved.   She does still note lower back pain and is aware she has facet arthropathy, spondylolisthesis and this is likely where some of the residual back pain is from. We discussed further treatment options including physical therapy, role of injections and she may benefit from lumbar facet injections. She is not having radicular symptoms. At this time she is interested in pursuing physical therapy for core strengthening. If symptoms do not improve, we will consider lumbar injections would likely recommend L4-5 L5-S1 facet injections. No orders of the defined types were placed in this encounter. Orders Placed This Encounter   Procedures    XR LUMBAR SPINE (2-3 VIEWS)    Amb External Referral To Physical Therapy                Return if symptoms worsen or fail to improve. Briseyda Koch PA-C  03/07/23      Elements of this note were created using speech recognition software. As such, errors of speech recognition may be present.

## 2023-03-07 NOTE — PATIENT INSTRUCTIONS
Patient Education        Facet Joint Injection: Before Your Procedure  What is a facet joint injection? A facet joint injection is a shot of medicine to help with pain from arthritis. The injection goes into your neck or back. Where you get your shot depends on where your pain is. Facet joints connect your vertebrae to each other along the back of your spine. Problems in these joints can cause long-term (chronic) pain in the neck or back. Numbing medicine is injected into the facet joint to see if that is the cause of your pain. If it does help your pain, your doctor may add a steroid medicine to the injection. Steroids reduce swelling and pain, but they don't always work. How do you prepare for the procedure? Procedures can be stressful. This information will help you understand what you can expect. And it will help you safely prepare for your procedure. Preparing for the procedure    Be sure you have someone to take you home. Anesthesia and pain medicine will make it unsafe for you to drive or get home on your own. Understand exactly what procedure is planned, along with the risks, benefits, and other options. If you take a medicine that prevents blood clots, your doctor may tell you to stop taking it before your procedure. Or your doctor may tell you to keep taking it. (These medicines include aspirin and other blood thinners.) Make sure that you understand exactly what your doctor wants you to do. Tell your doctor ALL the medicines, vitamins, supplements, and herbal remedies you take. Some may increase the risk of problems during your procedure. Your doctor will tell you if you should stop taking any of them before the procedure and how soon to do it. Make sure your doctor and the hospital have a copy of your advance directive. If you don't have one, you may want to prepare one. It lets others know your health care wishes.  It's a good thing to have before any type of surgery or procedure. What happens on the day of the procedure? Follow the instructions exactly about when to stop eating and drinking. If you don't, your procedure may be canceled. If your doctor told you to take your medicines on the day of the procedure, take them with only a sip of water. Take a bath or shower before you come in for your procedure. Do not apply lotions, perfumes, deodorants, or nail polish. Take off all jewelry and piercings. And take out contact lenses, if you wear them. At the hospital or surgery center   Bring a picture ID. You may get medicine that relaxes you or puts you in a light sleep. The area being worked on will be numb. The procedure will take 10 to 30 minutes. When should you call your doctor? You have questions or concerns. You don't understand how to prepare for your procedure. You become ill before the procedure (such as fever, flu, or a cold). You need to reschedule or have changed your mind about having the procedure. Current as of: March 9, 2022               Content Version: 13.5  © 2006-2022 [employer]. Care instructions adapted under license by [address] [place] [address]. If you have questions about a medical condition or this instruction, always ask your healthcare professional. [place] [address] any warranty or liability for your use of this information. Spondylitis (Spinal Arthritis) and Facet Joint Syndrome  At each level in our spine, there is a single disc  the bones (vertebrae) in front of the spinal canal, and a pair of joints called facet joints joining the bones together behind the spinal canal. As we age, the spinal discs and facet joints can wear out and degenerate. Disc degeneration is the term used to describe the wearing out of the discs. Spondylosis is the term used to describe degeneration and arthritis of the facet joints.  Degeneration of the spine is a normal aging process, and in most cases spinal arthritis does not cause significant symptoms. However, for some people, arthritic facet joints can cause significant pain. Back or neck pain resulting from arthritic or inflamed facet joints is a condition called facet joint syndrome. Symptoms  Back pain with radiation into hips and buttocks or neck pain with radiation into the shoulders  Natural History (Doing Nothing)  Not all arthritic facet joints cause symptoms   Back or neck pain may not be coming from the facet joints even if they are arthritic   Symptoms may resolve without treatment   Symptoms may be short-lived and infrequent   Rarely, patients develop more persistent and debilitating pain   Facet joints have very little ability to repair themselves or regenerate  Three Phases of Treatment:   Phase I - Non-Invasive Treatments   Phase II - Spinal Injections   Phase III - Surgery (rare for this condition unless radiculopathy is present)   Goals of Each Phase:   Relieve Pain   Improve Function  Treatment Options: Phase I - Non-Invasive Treatments  Physical Therapy and Regular Home Exercise   Neck or Back Strengthening   Flexibility and Stretching  Oral Medications   Steroids   Non-Steroid Anti-Inflammatories (NSAIDs)   Pain relievers   Muscle Relaxants  Ice and Heat   4-6 weeks of Phase I treatment before MRI and going to Phase II  Treatment Options: Phase II - Facet Joint Injections and Facet Joint Nerve Ablation (RFNA)  Facet Joint Injections   Outpatient procedure   Done with x-ray guidance   Steroid is injected into the inflamed joint to reduce pain   May relieve symptoms, but will not reverse the joint arthritis   Successful injection may help confirm that pain is coming from the facet joints   Facet Joint Rhizotomy (Nerve Ablation) (Radiofrequency Nerve Ablation - RFNA)  The word rhizotomy means nerve destruction or nerve ablation.  In facet rhizotomy, the tiny nerve fibers that carry pain signals from the facet joints to the brain are selectively destroyed using some form of energy. For patients who have had successful, but temporary relief of their back or neck pain from the facet injections, facet rhizotomy may provide more long-term relief. Facet rhizotomy is most commonly performed using a form of energy called radiofrequency (RF) energy. When done with RF, this technique is often called radiofrequency nerve ablation (RFNA).    Treatment Options: Phase III - Surgery  Rarely needed for Spondylosis or Facet Joint Syndrome unless radiculopathy or stenosis is present   Back and neck pain from Spondylosis can be treated non-surgically in most cases    [facility]    Domonique Gonzales MD  Minus MD Morris  [address], [place]ILLIANA Nuno NP    Main Office  [address] [facility], [facility] [facility] [place], [facility] [address]       For Appointments at either location, please call [phone]

## 2023-03-07 NOTE — LETTER
Manish Nahid                                                     FREDI SINGH  1960  MRN [id number]                                              ROOM NUMBER______      Radiographic Studies:    Cervical MRI      Thoracic MRI         Lumbar MRI          Pelvis MRI        CONTRAST    CT Myelogram: _______________   NCS/EMG ________________ ( UE  /  LE )     MRI of ___________________          Other: ____________________      Injections:    KNEE    HIP  Depomedrol _____ mg Euflexxa _____    _______________ TFESI/SNRB  _______________ SI Joint  _______________ ANITA    _______________ Facet  _______________Piriformis/ Sciatica      Medications:    Oral Steroids _______________  NSAIDS _______________    Muscle Relaxers _______________  Neurontin/Lyrica _______________    Pain Medicine _______________  Other _______________                       Physical Therapy:    Lumbar     Thoracic      Cervical     Hip       Knee       Shoulder               Traction          Ultrasound          Dry Needling      Referral:    Pain referral:  [facility]   [facility]   Other: ______________________________    Follow-up/ Refer__________________________________________________    Authorization to hold blood thinners:___________________________________

## 2023-09-11 ENCOUNTER — INITIAL CONSULT (OUTPATIENT)
Age: 63
End: 2023-09-11
Payer: OTHER GOVERNMENT

## 2023-09-11 VITALS
BODY MASS INDEX: 25.61 KG/M2 | HEART RATE: 74 BPM | HEIGHT: 64 IN | WEIGHT: 150 LBS | DIASTOLIC BLOOD PRESSURE: 80 MMHG | SYSTOLIC BLOOD PRESSURE: 110 MMHG

## 2023-09-11 DIAGNOSIS — I25.10 CORONARY ARTERY DISEASE INVOLVING NATIVE CORONARY ARTERY OF NATIVE HEART WITHOUT ANGINA PECTORIS: Primary | ICD-10-CM

## 2023-09-11 PROCEDURE — 99204 OFFICE O/P NEW MOD 45 MIN: CPT | Performed by: INTERNAL MEDICINE

## 2023-09-11 PROCEDURE — 93000 ELECTROCARDIOGRAM COMPLETE: CPT | Performed by: INTERNAL MEDICINE

## 2023-09-11 RX ORDER — ASPIRIN 325 MG
325 TABLET ORAL DAILY
COMMUNITY

## 2023-09-11 RX ORDER — SIMVASTATIN 40 MG
40 TABLET ORAL NIGHTLY
Qty: 90 TABLET | Refills: 3 | Status: SHIPPED | OUTPATIENT
Start: 2023-09-11

## 2023-09-25 ASSESSMENT — ENCOUNTER SYMPTOMS
ORTHOPNEA: 0
SHORTNESS OF BREATH: 0
ABDOMINAL PAIN: 0

## 2023-09-25 NOTE — PROGRESS NOTES
[facility] CARDIOLOGY  [address] [address]  PHONE: [phone]      23    NAME:  Corrina Sher  : 1960  MRN: [id number]         SUBJECTIVE:   Corrina Sher is a 60 y.o. female seen for a consultation visit regarding the following:     Chief Complaint   Patient presents with    Consultation    Coronary Artery Disease    Establish Cardiologist            HPI:  Consultation is requested by PROVIDER UNKNOWN for evaluation of Consultation, Coronary Artery Disease, and Establish Cardiologist   .    Ms. Carson Araiza presents today for follow-up. Patient said history of coronary artery disease. Does not follow with a cardiologist in quite some time. She is on simvastatin and metoprolol. Reports she had some intermittent chest discomfort recently. Nonexertional.  She does not have any radiation of her discomfort, nausea vomiting or diaphoresis. She denies orthopnea, PND, palpitations, syncope or lower extremity swelling. She denies smoker. Coronary Artery Disease  Pertinent negatives include no chest pain, leg swelling, palpitations or shortness of breath. Key CAD CHF Meds            simvastatin (ZOCOR) 40 MG tablet (Taking)    Sig - Route: Take 1 tablet by mouth nightly - Oral    metoprolol tartrate (LOPRESSOR) 25 MG tablet (Taking)    Sig - Route: Take 1 tablet by mouth 2 times daily - Oral          Key Antihyperglycemic Medications       Patient is on no antihyperglycemic meds. Past Medical History, Past Surgical History, Family history, Social History, and Medications were all reviewed with the patient today and updated as necessary. Prior to Admission medications    Medication Sig Start Date End Date Taking?  Authorizing Provider   aspirin 325 MG tablet Take 1 tablet by mouth daily   Yes Historical Provider, MD   simvastatin (ZOCOR) 40 MG tablet Take 1 tablet by mouth nightly 23  Yes Johnson Arriola MD   metoprolol

## 2024-03-27 DIAGNOSIS — I25.10 CORONARY ARTERY DISEASE INVOLVING NATIVE CORONARY ARTERY OF NATIVE HEART WITHOUT ANGINA PECTORIS: ICD-10-CM

## 2024-03-27 NOTE — TELEPHONE ENCOUNTER
Requested Prescriptions     Pending Prescriptions Disp Refills    metoprolol tartrate (LOPRESSOR) 25 MG tablet 180 tablet 3     Sig: Take 1 tablet by mouth 2 times daily

## 2024-03-27 NOTE — TELEPHONE ENCOUNTER
MEDICATION REFILL REQUEST      Name of Medication:  metoprolol tartrate (LOPRESSOR) 25 MG tablet   Dose:    Frequency:    Quantity:  3  Days' supply:  90      Pharmacy Name/Location:  [facility]    Next appt 9/13/24      Please call and advise if need be!

## 2024-06-06 DIAGNOSIS — I25.10 CORONARY ARTERY DISEASE INVOLVING NATIVE CORONARY ARTERY OF NATIVE HEART WITHOUT ANGINA PECTORIS: ICD-10-CM

## 2024-06-06 NOTE — TELEPHONE ENCOUNTER
MEDICATION REFILL REQUEST      Name of Medication:  metoprolol   Dose:  25 mg  Frequency:  twice a day   Quantity:    Days' supply:  90      Pharmacy Name/Location:  [facility]

## 2024-09-13 ENCOUNTER — OFFICE VISIT (OUTPATIENT)
Age: 64
End: 2024-09-13
Payer: OTHER GOVERNMENT

## 2024-09-13 VITALS
WEIGHT: 151 LBS | BODY MASS INDEX: 25.78 KG/M2 | HEIGHT: 64 IN | SYSTOLIC BLOOD PRESSURE: 128 MMHG | DIASTOLIC BLOOD PRESSURE: 88 MMHG | HEART RATE: 63 BPM

## 2024-09-13 DIAGNOSIS — I25.10 CORONARY ARTERY DISEASE INVOLVING NATIVE CORONARY ARTERY OF NATIVE HEART WITHOUT ANGINA PECTORIS: Primary | ICD-10-CM

## 2024-09-13 PROCEDURE — 93000 ELECTROCARDIOGRAM COMPLETE: CPT | Performed by: INTERNAL MEDICINE

## 2024-09-13 PROCEDURE — 99213 OFFICE O/P EST LOW 20 MIN: CPT | Performed by: INTERNAL MEDICINE

## 2024-09-13 RX ORDER — SIMVASTATIN 40 MG
40 TABLET ORAL NIGHTLY
Qty: 90 TABLET | Refills: 3 | Status: SHIPPED | OUTPATIENT
Start: 2024-09-13

## 2024-09-13 ASSESSMENT — ENCOUNTER SYMPTOMS
SHORTNESS OF BREATH: 0
ABDOMINAL PAIN: 0

## 2025-01-29 ENCOUNTER — TELEPHONE (OUTPATIENT)
Age: 65
End: 2025-01-29

## 2025-01-29 NOTE — TELEPHONE ENCOUNTER
Received clearance form, called patient and notified this will be faxed over tomorrow when Dr. Gaudarrama is in the office.

## 2025-01-29 NOTE — TELEPHONE ENCOUNTER
Pt is calling saying she dropped off a preclearance form from [facility].pt said she dropped it off last Tuesday and is calling about it,Please call pt